# Patient Record
Sex: MALE | Race: WHITE | NOT HISPANIC OR LATINO | URBAN - METROPOLITAN AREA
[De-identification: names, ages, dates, MRNs, and addresses within clinical notes are randomized per-mention and may not be internally consistent; named-entity substitution may affect disease eponyms.]

---

## 2018-07-05 ENCOUNTER — EMERGENCY (EMERGENCY)
Facility: HOSPITAL | Age: 58
LOS: 1 days | Discharge: HOME | End: 2018-07-05
Attending: EMERGENCY MEDICINE
Payer: MEDICARE

## 2018-07-05 VITALS
WEIGHT: 190.04 LBS | RESPIRATION RATE: 16 BRPM | SYSTOLIC BLOOD PRESSURE: 183 MMHG | TEMPERATURE: 98 F | HEART RATE: 119 BPM | HEIGHT: 67 IN | DIASTOLIC BLOOD PRESSURE: 97 MMHG | OXYGEN SATURATION: 98 %

## 2018-07-05 VITALS
DIASTOLIC BLOOD PRESSURE: 84 MMHG | TEMPERATURE: 98 F | RESPIRATION RATE: 17 BRPM | SYSTOLIC BLOOD PRESSURE: 143 MMHG | HEART RATE: 95 BPM | OXYGEN SATURATION: 98 %

## 2018-07-05 LAB
ALBUMIN SERPL ELPH-MCNC: 3.3 G/DL — SIGNIFICANT CHANGE UP (ref 3.3–5)
ALP SERPL-CCNC: 94 U/L — SIGNIFICANT CHANGE UP (ref 40–120)
ALT FLD-CCNC: 33 U/L — SIGNIFICANT CHANGE UP (ref 12–78)
ANION GAP SERPL CALC-SCNC: 9 MMOL/L — SIGNIFICANT CHANGE UP (ref 5–17)
AST SERPL-CCNC: 31 U/L — SIGNIFICANT CHANGE UP (ref 15–37)
BASOPHILS # BLD AUTO: 0 K/UL — SIGNIFICANT CHANGE UP (ref 0–0.2)
BASOPHILS NFR BLD AUTO: 0 % — SIGNIFICANT CHANGE UP (ref 0–2)
BILIRUB SERPL-MCNC: 0.4 MG/DL — SIGNIFICANT CHANGE UP (ref 0.2–1.2)
BUN SERPL-MCNC: 15 MG/DL — SIGNIFICANT CHANGE UP (ref 7–23)
CALCIUM SERPL-MCNC: 7.9 MG/DL — LOW (ref 8.5–10.1)
CHLORIDE SERPL-SCNC: 102 MMOL/L — SIGNIFICANT CHANGE UP (ref 96–108)
CO2 SERPL-SCNC: 26 MMOL/L — SIGNIFICANT CHANGE UP (ref 22–31)
CREAT SERPL-MCNC: 1.1 MG/DL — SIGNIFICANT CHANGE UP (ref 0.5–1.3)
D DIMER BLD IA.RAPID-MCNC: <150 NG/ML DDU — SIGNIFICANT CHANGE UP
EOSINOPHIL # BLD AUTO: 0 K/UL — SIGNIFICANT CHANGE UP (ref 0–0.5)
EOSINOPHIL NFR BLD AUTO: 0 % — SIGNIFICANT CHANGE UP (ref 0–6)
GLUCOSE SERPL-MCNC: 285 MG/DL — HIGH (ref 70–99)
HCT VFR BLD CALC: 39.6 % — SIGNIFICANT CHANGE UP (ref 39–50)
HGB BLD-MCNC: 14.6 G/DL — SIGNIFICANT CHANGE UP (ref 13–17)
IMM GRANULOCYTES NFR BLD AUTO: 0.4 % — SIGNIFICANT CHANGE UP (ref 0–1.5)
LYMPHOCYTES # BLD AUTO: 1.7 K/UL — SIGNIFICANT CHANGE UP (ref 1–3.3)
LYMPHOCYTES # BLD AUTO: 20.9 % — SIGNIFICANT CHANGE UP (ref 13–44)
MCHC RBC-ENTMCNC: 30.7 PG — SIGNIFICANT CHANGE UP (ref 27–34)
MCHC RBC-ENTMCNC: 36.9 GM/DL — HIGH (ref 32–36)
MCV RBC AUTO: 83.2 FL — SIGNIFICANT CHANGE UP (ref 80–100)
MONOCYTES # BLD AUTO: 0.55 K/UL — SIGNIFICANT CHANGE UP (ref 0–0.9)
MONOCYTES NFR BLD AUTO: 6.8 % — SIGNIFICANT CHANGE UP (ref 2–14)
NEUTROPHILS # BLD AUTO: 5.85 K/UL — SIGNIFICANT CHANGE UP (ref 1.8–7.4)
NEUTROPHILS NFR BLD AUTO: 71.9 % — SIGNIFICANT CHANGE UP (ref 43–77)
PLATELET # BLD AUTO: 199 K/UL — SIGNIFICANT CHANGE UP (ref 150–400)
POTASSIUM SERPL-MCNC: 3.8 MMOL/L — SIGNIFICANT CHANGE UP (ref 3.5–5.3)
POTASSIUM SERPL-SCNC: 3.8 MMOL/L — SIGNIFICANT CHANGE UP (ref 3.5–5.3)
PROT SERPL-MCNC: 7.1 G/DL — SIGNIFICANT CHANGE UP (ref 6–8.3)
RAPID RVP RESULT: SIGNIFICANT CHANGE UP
RBC # BLD: 4.76 M/UL — SIGNIFICANT CHANGE UP (ref 4.2–5.8)
RBC # FLD: 12.2 % — SIGNIFICANT CHANGE UP (ref 10.3–14.5)
S PYO AG SPEC QL IA: NEGATIVE — SIGNIFICANT CHANGE UP
SODIUM SERPL-SCNC: 137 MMOL/L — SIGNIFICANT CHANGE UP (ref 135–145)
WBC # BLD: 8.13 K/UL — SIGNIFICANT CHANGE UP (ref 3.8–10.5)
WBC # FLD AUTO: 8.13 K/UL — SIGNIFICANT CHANGE UP (ref 3.8–10.5)

## 2018-07-05 PROCEDURE — 93005 ELECTROCARDIOGRAM TRACING: CPT

## 2018-07-05 PROCEDURE — 71046 X-RAY EXAM CHEST 2 VIEWS: CPT

## 2018-07-05 PROCEDURE — 87581 M.PNEUMON DNA AMP PROBE: CPT

## 2018-07-05 PROCEDURE — 87633 RESP VIRUS 12-25 TARGETS: CPT

## 2018-07-05 PROCEDURE — 99284 EMERGENCY DEPT VISIT MOD MDM: CPT

## 2018-07-05 PROCEDURE — 99283 EMERGENCY DEPT VISIT LOW MDM: CPT | Mod: 25

## 2018-07-05 PROCEDURE — 87486 CHLMYD PNEUM DNA AMP PROBE: CPT

## 2018-07-05 PROCEDURE — 85027 COMPLETE CBC AUTOMATED: CPT

## 2018-07-05 PROCEDURE — 87798 DETECT AGENT NOS DNA AMP: CPT

## 2018-07-05 PROCEDURE — 80053 COMPREHEN METABOLIC PANEL: CPT

## 2018-07-05 PROCEDURE — 36415 COLL VENOUS BLD VENIPUNCTURE: CPT

## 2018-07-05 PROCEDURE — 85379 FIBRIN DEGRADATION QUANT: CPT

## 2018-07-05 PROCEDURE — 87081 CULTURE SCREEN ONLY: CPT

## 2018-07-05 PROCEDURE — 87880 STREP A ASSAY W/OPTIC: CPT

## 2018-07-05 PROCEDURE — 71046 X-RAY EXAM CHEST 2 VIEWS: CPT | Mod: 26

## 2018-07-05 NOTE — ED PROVIDER NOTE - ENMT, MLM
Airway patent, Nasal mucosa clear. Mouth with normal mucosa. Throat has no vesicles, no oropharyngeal exudates and uvula is midline. Airway patent, Nasal mucosa clear. Mouth with normal mucosa. Throat has no vesicles, no oropharyngeal exudates and uvula is midline. b/l TM +effusion

## 2018-07-05 NOTE — ED ADULT NURSE NOTE - OBJECTIVE STATEMENT
Pt. received alert and oriented x4 with chief complaint of cough for 2 months. Pt. denies any other symptoms. Pt. presents w/ dry, nonproductive cough. Pt. presents w/ clear lung sounds upon auscultation. Pt. denies SOB.

## 2018-07-05 NOTE — ED PROVIDER NOTE - DATE/TIME 1
See your primary care doctor within 24-48 hours. Follow up with hand this week for further evaluation, bring copies of all reports with you. Take Motrin 600mg every 8hrs with food for pain.  Percocet 1 tablets every 6 hrs as needed for breakthrough pain-caution drowsiness while taking this medication- do not drive or operate heavy machinery. Keep splint on for comfort- cover with a plastic bag when showering. Return to the ER for worsening symptoms or any other concerns.
05-Jul-2018 20:08

## 2018-07-05 NOTE — ED PROVIDER NOTE - OBJECTIVE STATEMENT
58 yo male hx of htn, psoriasis c/o nonproductive cough x 4 weeks with runny nose, here for the summer from [place], thinks it may be environmental factors at his current residence. His symptoms improved after staying at hotel x 2 days.  Finished course of z-pack 10 days ago.  No myalgias.  No fever/chills.

## 2018-07-07 LAB
CULTURE RESULTS: SIGNIFICANT CHANGE UP
SPECIMEN SOURCE: SIGNIFICANT CHANGE UP